# Patient Record
Sex: FEMALE | URBAN - METROPOLITAN AREA
[De-identification: names, ages, dates, MRNs, and addresses within clinical notes are randomized per-mention and may not be internally consistent; named-entity substitution may affect disease eponyms.]

---

## 2020-07-01 ENCOUNTER — OFFICE VISIT CONVERTED (OUTPATIENT)
Dept: GASTROENTEROLOGY | Facility: CLINIC | Age: 75
End: 2020-07-01
Attending: NURSE PRACTITIONER

## 2020-07-01 ENCOUNTER — CONVERSION ENCOUNTER (OUTPATIENT)
Dept: OTHER | Facility: HOSPITAL | Age: 75
End: 2020-07-01

## 2021-05-10 NOTE — H&P
History and Physical      Patient Name: Jillian Garner   Patient ID: [id number]   Sex: Female   YOB: 1945    Primary Care Provider: Ray Tinoco MD   Referring Provider: Ray Tinoco MD    Visit Date: July 1, 2020    Provider: STEFANIA Finley   Location: [facility]   Location Address: [address] [place] [address], [address]  [place], [place]  [id number]   Location Phone: [phone]          Chief Complaint  · Nausea  · Constipation      History Of Present Illness  The patient is a 74 year old female who presents on referral from Ray Tinoco MD for a gastroenterology evaluation.      Patient reports constant nausea with occasional vomiting. Nausea is worse in the AM. Nothing makes nausea better. Decreased appetite with a 10lb weight loss in the last 2 months. Recent hospitalization for congestive heart failure and cardioversion. Denies heartburn, dsyagphia, or NSAID usage.     Complaints of constipation. Only having a bowel movement every 3 days with miralax and Dulcolax suppositories. Denies any hematochezia, melena, or abdominal pain.     PMH: H/o congestive heart failure. Takes Xarelto, managed by Dr. Robertson at [facility].    FMH: Sister, colon cancer, dx age 70's.     CMP 6/16/2020: GFR 42, alkaline phosphatase 68, AST 20, ALT 22.  CBC 6/16/2020: WBC 8.5, hemoglobin 13.6, hematocrit 42.2, platelets 194.  Abdominal ultrasound 6/15/2020: Small kidneys with cortical thinning. Cholecystectomy.  Upper gastrointestinal series with KUB 6/15/2020: Fecal accumulation.    Colonoscopy 12/12/2012 (Dr. Tinoco): Mild to moderate sigmoid diverticulosis.  EGD 6/1/2011(): Hiatal hernia with gastroesophageal reflux.  Antritis, secondary to bowel reflux.  Plan antrum biopsyminimal chronic gastritis, nonspecific.  GE junction biopsymild chronic inflammation.       Past Medical History  Irritable bowel syndrome         Past Surgical History  Gallbladder removal         Medication  "List  amiodarone 100 mg oral tablet; folic acid 1 mg oral tablet; furosemide 40 mg oral tablet; Nexium 20 mg oral capsule,delayed release(DR/EC); venlafaxine 75 mg oral capsule,extended release 24hr; Xarelto 15 mg oral tablet         Allergy List  NO KNOWN DRUG ALLERGIES       Allergies Reconciled  Family Medical History  Family history of colon cancer         Social History  Alcohol (Never); Tobacco (Never)         Review of Systems  · Constitutional  o Denies  o : chills, fever  · Eyes  o Denies  o : blurred vision, changes in vision  · Cardiovascular  o Denies  o : chest pain, syncope  · Respiratory  o Denies  o : shortness of breath, hoarseness  · Gastrointestinal  o Admits  o : See HPI  · Genitourinary  o Denies  o : dysuria, blood in urine  · Integument  o Denies  o : rash, skin dryness  · Neurologic  o Denies  o : altered mental status, tingling or numbness  · Musculoskeletal  o Denies  o : joint pain, limitation of motion  · Endocrine  o Admits  o : weight loss  o Denies  o : weight gain  · Psychiatric  o Denies  o : anxiety, depression      Vitals  Date Time BP Position Site L\R Cuff Size HR RR TEMP (F) WT  HT  BMI kg/m2 BSA m2 O2 Sat        07/01/2020 10:00 /91 Sitting    65 - R  98.4 105lbs 8oz 5'  3\" 18.69 1.46           Physical Examination  · Constitutional  o Appearance  o : well developed, well-nourished, in no acute distress  · Eyes  o Vision  o :   § Visual Fields  § : eyes move symmetrical in all directions  o Sclerae  o : anicteric  o Pupils and Irises  o : pupils equal and symmetrical  · Neck  o Inspection/Palpation  o : supple  · Respiratory  o Respiratory Effort  o : breathing unlabored  o Inspection of Chest  o : normal appearance, no retractions  o Auscultation of Lungs  o : clear to auscultation bilaterally  · Cardiovascular  o Heart  o :   § Auscultation of Heart  § : no murmurs, gallops or rubs  · Gastrointestinal  o Abdominal Examination  o : soft, nontender to palpation, with " normal active bowel sounds, no appreciable hepatosplenomegaly  o Digital Rectal Exam  o : deferred  · Lymphatic  o Neck  o : no palpable lymphadenopathy  · Skin and Subcutaneous Tissue  o General Inspection  o : without focal lesions; turgor is normal  · Psychiatric  o General  o : Alert and oriented x3  o Mood and Affect  o : Mood and affect are appropriate to circumstances          Assessment  · Constipation     564.00/K59.00  · Nausea and vomiting     787.01/R11.2    Problems Reconciled  Plan  · Medications  o Zofran 8 mg oral tablet   SIG: take 1 tablet (8 mg) by oral route every 8 hours for nausea   DISP: (30) tablets with 0 refills  Prescribed on 07/01/2020     o Trulance 3 mg oral tablet   SIG: take 1 tablet (3 mg) by oral route once daily for 90 days   DISP: (90) tablets with 0 refills  Prescribed on 07/01/2020     o Medications have been Reconciled  o Transition of Care or Provider Policy  · Instructions  o Information given on current diagnoses.  o Lifestyle modifications discussed.  o No relief of symptoms with treatment of constipation consider scopes.  o Electronically Identified Patient Education Materials Provided Electronically  · Disposition  o 6 week f/u            Electronically Signed by: STEFANIA Finley -Author on July 1, 2020 02:02:45 PM

## 2021-05-15 VITALS
HEIGHT: 63 IN | SYSTOLIC BLOOD PRESSURE: 147 MMHG | WEIGHT: 105.5 LBS | HEART RATE: 65 BPM | BODY MASS INDEX: 18.69 KG/M2 | TEMPERATURE: 98.4 F | DIASTOLIC BLOOD PRESSURE: 91 MMHG

## 2024-02-25 ENCOUNTER — OUTSIDE FACILITY SERVICE (OUTPATIENT)
Dept: CARDIOLOGY | Facility: CLINIC | Age: 79
End: 2024-02-25
Payer: MEDICARE

## 2024-02-26 PROCEDURE — 99222 1ST HOSP IP/OBS MODERATE 55: CPT | Performed by: NURSE PRACTITIONER
